# Patient Record
Sex: FEMALE | Race: WHITE | URBAN - METROPOLITAN AREA
[De-identification: names, ages, dates, MRNs, and addresses within clinical notes are randomized per-mention and may not be internally consistent; named-entity substitution may affect disease eponyms.]

---

## 2021-03-18 ENCOUNTER — NEW PATIENT (OUTPATIENT)
Dept: URBAN - METROPOLITAN AREA CLINIC 64 | Facility: CLINIC | Age: 56
End: 2021-03-18
Payer: COMMERCIAL

## 2021-03-18 DIAGNOSIS — H16.223 KERATOCONJUNCTIVITIS SICCA, BILATERAL: Primary | ICD-10-CM

## 2021-03-18 DIAGNOSIS — H52.13 MYOPIA, BILATERAL: ICD-10-CM

## 2021-03-18 PROCEDURE — 92004 COMPRE OPH EXAM NEW PT 1/>: CPT | Performed by: OPTOMETRIST

## 2021-03-18 ASSESSMENT — KERATOMETRY
OS: 45.09
OD: 45.09

## 2021-03-18 ASSESSMENT — INTRAOCULAR PRESSURE
OD: 13
OS: 15

## 2021-03-18 ASSESSMENT — VISUAL ACUITY
OD: 20/20
OS: 20/20

## 2022-03-23 ENCOUNTER — OFFICE VISIT (OUTPATIENT)
Dept: URBAN - METROPOLITAN AREA CLINIC 64 | Facility: CLINIC | Age: 57
End: 2022-03-23
Payer: COMMERCIAL

## 2022-03-23 DIAGNOSIS — H52.4 PRESBYOPIA: ICD-10-CM

## 2022-03-23 PROCEDURE — 99214 OFFICE O/P EST MOD 30 MIN: CPT | Performed by: OPTOMETRIST

## 2022-03-23 ASSESSMENT — INTRAOCULAR PRESSURE
OS: 20
OD: 20

## 2022-03-23 ASSESSMENT — VISUAL ACUITY
OD: 20/20
OS: 20/20

## 2022-03-23 NOTE — IMPRESSION/PLAN
Impression: Keratoconjunctivitis sicca, bilateral: M75.324. Plan: Patient's complaint is consistent with dry eye syndrome. There is no evidence of permanent changes to the cornea. Explained there are ways to improve the symptoms but no permanent fix for the symptoms. Reviewed options including topical treatments, punctal plugs and permanent punctal occlusion.   Pt to RTC in at patient's convenience for punctal plugs

## 2023-06-19 ENCOUNTER — OFFICE VISIT (OUTPATIENT)
Dept: URBAN - METROPOLITAN AREA CLINIC 64 | Facility: LOCATION | Age: 58
End: 2023-06-19
Payer: COMMERCIAL

## 2023-06-19 DIAGNOSIS — H52.4 PRESBYOPIA: ICD-10-CM

## 2023-06-19 DIAGNOSIS — H25.13 AGE-RELATED NUCLEAR CATARACT, BILATERAL: Primary | ICD-10-CM

## 2023-06-19 PROCEDURE — 99214 OFFICE O/P EST MOD 30 MIN: CPT | Performed by: OPTOMETRIST

## 2023-06-19 ASSESSMENT — VISUAL ACUITY
OD: 20/20
OS: 20/20

## 2023-06-19 ASSESSMENT — INTRAOCULAR PRESSURE
OS: 10
OD: 10

## 2024-08-13 ENCOUNTER — OFFICE VISIT (OUTPATIENT)
Dept: URBAN - METROPOLITAN AREA CLINIC 64 | Facility: LOCATION | Age: 59
End: 2024-08-13
Payer: COMMERCIAL

## 2024-08-13 DIAGNOSIS — H52.4 PRESBYOPIA: ICD-10-CM

## 2024-08-13 DIAGNOSIS — H25.13 AGE-RELATED NUCLEAR CATARACT, BILATERAL: Primary | ICD-10-CM

## 2024-08-13 DIAGNOSIS — H16.223 KERATOCONJUNCTIVITIS SICCA, BILATERAL: ICD-10-CM

## 2024-08-13 PROCEDURE — 99214 OFFICE O/P EST MOD 30 MIN: CPT | Performed by: OPTOMETRIST

## 2024-08-13 ASSESSMENT — INTRAOCULAR PRESSURE
OD: 18
OS: 17

## 2024-08-13 ASSESSMENT — VISUAL ACUITY
OD: 20/20
OS: 20/20

## 2025-06-05 ENCOUNTER — NEW PATIENT (OUTPATIENT)
Age: 60
End: 2025-06-05

## 2025-06-05 DIAGNOSIS — H00.11: ICD-10-CM

## 2025-06-05 DIAGNOSIS — H01.024: ICD-10-CM

## 2025-06-05 DIAGNOSIS — H01.021: ICD-10-CM

## 2025-06-05 PROCEDURE — 99203 OFFICE O/P NEW LOW 30 MIN: CPT

## 2025-06-05 RX ORDER — NEOMYCIN SULFATE, POLYMYXIN B SULFATE AND DEXAMETHASONE 3.5; 10000; 1 MG/G; [USP'U]/G; MG/G: OINTMENT OPHTHALMIC TWICE A DAY

## 2025-08-13 ENCOUNTER — OFFICE VISIT (OUTPATIENT)
Dept: URBAN - METROPOLITAN AREA CLINIC 64 | Facility: LOCATION | Age: 60
End: 2025-08-13
Payer: COMMERCIAL

## 2025-08-13 DIAGNOSIS — H52.4 PRESBYOPIA: ICD-10-CM

## 2025-08-13 DIAGNOSIS — B88.0 DERMATITIS DUE TO DEMODEX SPECIES: ICD-10-CM

## 2025-08-13 DIAGNOSIS — H25.13 AGE-RELATED NUCLEAR CATARACT, BILATERAL: Primary | ICD-10-CM

## 2025-08-13 DIAGNOSIS — H16.223 KERATOCONJUNCTIVITIS SICCA, BILATERAL: ICD-10-CM

## 2025-08-13 PROCEDURE — 99214 OFFICE O/P EST MOD 30 MIN: CPT | Performed by: OPTOMETRIST

## 2025-08-13 RX ORDER — LOTILANER OPHTHALMIC SOLUTION 2.5 MG/ML
0.25 % SOLUTION/ DROPS OPHTHALMIC
Qty: 10 | Refills: 0 | Status: INACTIVE
Start: 2025-08-13 | End: 2025-09-23

## 2025-08-13 ASSESSMENT — VISUAL ACUITY
OD: 20/20
OS: 20/20

## 2025-08-13 ASSESSMENT — INTRAOCULAR PRESSURE
OS: 17
OD: 17